# Patient Record
Sex: FEMALE | Race: BLACK OR AFRICAN AMERICAN | NOT HISPANIC OR LATINO
[De-identification: names, ages, dates, MRNs, and addresses within clinical notes are randomized per-mention and may not be internally consistent; named-entity substitution may affect disease eponyms.]

---

## 2019-10-29 PROBLEM — Z00.00 ENCOUNTER FOR PREVENTIVE HEALTH EXAMINATION: Status: ACTIVE | Noted: 2019-10-29

## 2020-01-23 ENCOUNTER — APPOINTMENT (OUTPATIENT)
Dept: PULMONOLOGY | Facility: CLINIC | Age: 85
End: 2020-01-23
Payer: MEDICARE

## 2020-01-23 VITALS
OXYGEN SATURATION: 98 % | HEART RATE: 85 BPM | RESPIRATION RATE: 16 BRPM | DIASTOLIC BLOOD PRESSURE: 70 MMHG | TEMPERATURE: 98.6 F | SYSTOLIC BLOOD PRESSURE: 120 MMHG | WEIGHT: 148 LBS | HEIGHT: 63 IN | BODY MASS INDEX: 26.22 KG/M2

## 2020-01-23 DIAGNOSIS — Z87.891 PERSONAL HISTORY OF NICOTINE DEPENDENCE: ICD-10-CM

## 2020-01-23 PROCEDURE — 99204 OFFICE O/P NEW MOD 45 MIN: CPT | Mod: 25

## 2020-01-23 PROCEDURE — 94729 DIFFUSING CAPACITY: CPT

## 2020-01-23 PROCEDURE — 94060 EVALUATION OF WHEEZING: CPT

## 2020-01-23 PROCEDURE — 94727 GAS DIL/WSHOT DETER LNG VOL: CPT

## 2020-01-24 PROBLEM — Z87.891 FORMER SMOKER: Status: ACTIVE | Noted: 2020-01-24

## 2020-01-24 RX ORDER — FLUTICASONE PROPIONATE AND SALMETEROL 50; 500 UG/1; UG/1
500-50 POWDER RESPIRATORY (INHALATION)
Refills: 0 | Status: ACTIVE | COMMUNITY

## 2020-01-24 RX ORDER — FUROSEMIDE 20 MG/1
20 TABLET ORAL
Refills: 0 | Status: ACTIVE | COMMUNITY

## 2020-01-24 RX ORDER — AMLODIPINE BESYLATE 10 MG/1
10 TABLET ORAL
Refills: 0 | Status: ACTIVE | COMMUNITY

## 2020-01-24 NOTE — REASON FOR VISIT
[Consultation] : a consultation visit [Abnormal Chest X-Ray] : abnormal Chest X-Ray [Asthma] : asthma

## 2020-01-24 NOTE — PHYSICAL EXAM
[General Appearance - In No Acute Distress] : no acute distress [Neck Cervical Mass (___cm)] : no neck mass was observed [Heart Sounds] : normal S1 and S2 [Auscultation Breath Sounds / Voice Sounds] : lungs were clear to auscultation bilaterally [Abnormal Walk] : normal gait [Nail Clubbing] : no clubbing of the fingernails [Cyanosis, Localized] : no localized cyanosis [No Focal Deficits] : no focal deficits [] : no rash [Oriented To Time, Place, And Person] : oriented to person, place, and time [Impaired Insight] : insight and judgment were intact [Erythema] : no erythema of the pharynx

## 2020-01-24 NOTE — DISCUSSION/SUMMARY
[FreeTextEntry1] : She is an 84 year old woman who was referred for pulmonary nodules noted on a routine chest x-ray. She does have a history of asthma. No history of pneumonia or tuberculosis. She stopped smoking in the 1960's. No occupational exposures. \par \par The radiographic findings are most likely a sequela of prior inflammatory disease. A CT examination of the chest will be obtained.\par \par Her asthma is adequately controlled. She can continue with Advair and albuterol as needed.\par \par I will see her again after the CT has been obtained.

## 2020-01-24 NOTE — HISTORY OF PRESENT ILLNESS
[FreeTextEntry1] : 84 year old woman who was referred for pulmonary nodules noted on a routine chest x-ray. Has a history of asthma. No history of pneumonia or tuberculosis. She stopped smoking in the 1960's. No occupational exposures. \par \par Currently on Advair and Proventil. No wheezing. PERRY noted, chronic. No history of cardiac problems. No DM. Has HTN.

## 2020-01-24 NOTE — REVIEW OF SYSTEMS
[Dyspnea] : dyspnea [Hypertension] : ~T hypertension [Fever] : no fever [Postnasal Drip] : no postnasal drip [Cough] : no cough [Sputum] : not coughing up ~M sputum [Hemoptysis] : no hemoptysis [Wheezing] : no wheezing [PND] : no PND [Reflux] : no reflux [Nasal Discharge] : no nasal discharge [Arthralgias] : no arthralgias [Headache] : no headache [Raynaud] : no Raynaud's phenomenon was observed [Depression] : no depression [Thyroid Problem] : no thyroid problem [Diabetes] : no diabetes mellitus [DVT] : no DVT [Difficulty Maintaining Sleep] : no difficulty maintaining sleep [Difficulty Initiating Sleep] : no difficulty falling asleep

## 2020-02-20 ENCOUNTER — APPOINTMENT (OUTPATIENT)
Dept: PULMONOLOGY | Facility: CLINIC | Age: 85
End: 2020-02-20
Payer: MEDICARE

## 2020-02-20 VITALS
SYSTOLIC BLOOD PRESSURE: 122 MMHG | BODY MASS INDEX: 26.22 KG/M2 | RESPIRATION RATE: 17 BRPM | DIASTOLIC BLOOD PRESSURE: 71 MMHG | TEMPERATURE: 97.3 F | HEART RATE: 83 BPM | WEIGHT: 148 LBS | OXYGEN SATURATION: 98 %

## 2020-02-20 PROCEDURE — 99214 OFFICE O/P EST MOD 30 MIN: CPT

## 2020-02-20 NOTE — REVIEW OF SYSTEMS
[Fever] : no fever [Postnasal Drip] : no postnasal drip [Sputum] : not coughing up ~M sputum [Cough] : no cough [Chest Tightness] : no chest tightness [Dyspnea] : dyspnea [Hemoptysis] : no hemoptysis [Wheezing] : no wheezing [Hypertension] : ~T hypertension [Chest Discomfort] : no chest discomfort [PND] : no PND [Nasal Discharge] : no nasal discharge [Arthralgias] : no arthralgias [Reflux] : no reflux [Headache] : no headache [Raynaud] : no Raynaud's phenomenon was observed [Depression] : no depression [Diabetes] : no diabetes mellitus [DVT] : no DVT [Thyroid Problem] : no thyroid problem [Difficulty Initiating Sleep] : no difficulty falling asleep [Difficulty Maintaining Sleep] : no difficulty maintaining sleep

## 2020-02-20 NOTE — PHYSICAL EXAM
[General Appearance - In No Acute Distress] : no acute distress [Neck Cervical Mass (___cm)] : no neck mass was observed [Erythema] : no erythema of the pharynx [Heart Sounds] : normal S1 and S2 [Auscultation Breath Sounds / Voice Sounds] : lungs were clear to auscultation bilaterally [Abnormal Walk] : normal gait [Nail Clubbing] : no clubbing of the fingernails [Cyanosis, Localized] : no localized cyanosis [] : no rash [No Focal Deficits] : no focal deficits [Oriented To Time, Place, And Person] : oriented to person, place, and time [Impaired Insight] : insight and judgment were intact

## 2020-02-20 NOTE — HISTORY OF PRESENT ILLNESS
[FreeTextEntry1] : 84 year old woman who was referred for pulmonary nodules noted on a routine chest x-ray. Has a history of asthma. No history of pneumonia or tuberculosis. She stopped smoking in the 1960's. No occupational exposures. \par \par She was placed on Symbicort recently. She is feeling much better with it. Denies any cough or chest congestion. No constitutional complaints.\par \par

## 2020-02-20 NOTE — PROCEDURE
[FreeTextEntry1] : PFT 1/23/20: Minimal obstruction noted. There was some mild air trapping. Diffusion capacity was normal. No significant change after inhalation of bronchodilator.\par \par CXR 8/21/19 (done at [facility]): A 6 mm nodule was noted in the lower left lung zone. Also a nodule was present in the right midlung zone. No pleural effusions. Adenopathy was suspected. \par \par CT Chest 1/29/20: Multiple ground glass infiltrates in both lungs. Most pronounced in the right middle lobe. Ground glass also noted in the left upper lobe. Mild lymphadenopathy. Nodule in the right lobe of the thyroid also noted.

## 2020-02-20 NOTE — DISCUSSION/SUMMARY
[FreeTextEntry1] : She is an 84 year old woman who was referred for pulmonary nodules noted on a routine chest x-ray. She gave a history of asthma. No history of pneumonia or tuberculosis. She stopped smoking in the 1960.  No occupational exposures. \par \par Chest CT, as noted above, showed multiple ground glass opacities. Followup study to be obtained in several months. She is going to [place] and she will see me when she comes back.\par \par Advised to discuss the thyroid findings with her primary care physician. A sonogram of the thyroid may be indicated.

## 2020-06-18 ENCOUNTER — APPOINTMENT (OUTPATIENT)
Dept: PULMONOLOGY | Facility: CLINIC | Age: 85
End: 2020-06-18

## 2023-10-02 ENCOUNTER — APPOINTMENT (OUTPATIENT)
Dept: PULMONOLOGY | Facility: CLINIC | Age: 88
End: 2023-10-02
Payer: MEDICARE

## 2023-10-02 VITALS
TEMPERATURE: 96 F | SYSTOLIC BLOOD PRESSURE: 142 MMHG | OXYGEN SATURATION: 97 % | HEART RATE: 79 BPM | WEIGHT: 136 LBS | BODY MASS INDEX: 24.09 KG/M2 | DIASTOLIC BLOOD PRESSURE: 72 MMHG

## 2023-10-02 DIAGNOSIS — R05.8 OTHER SPECIFIED COUGH: ICD-10-CM

## 2023-10-02 DIAGNOSIS — R07.89 OTHER CHEST PAIN: ICD-10-CM

## 2023-10-02 PROCEDURE — 99214 OFFICE O/P EST MOD 30 MIN: CPT

## 2024-01-31 ENCOUNTER — APPOINTMENT (OUTPATIENT)
Dept: PULMONOLOGY | Facility: CLINIC | Age: 89
End: 2024-01-31
Payer: MEDICARE

## 2024-01-31 VITALS
DIASTOLIC BLOOD PRESSURE: 70 MMHG | BODY MASS INDEX: 23.74 KG/M2 | HEART RATE: 77 BPM | TEMPERATURE: 96 F | SYSTOLIC BLOOD PRESSURE: 120 MMHG | OXYGEN SATURATION: 99 % | WEIGHT: 134 LBS

## 2024-01-31 DIAGNOSIS — R91.8 OTHER NONSPECIFIC ABNORMAL FINDING OF LUNG FIELD: ICD-10-CM

## 2024-01-31 PROCEDURE — 94729 DIFFUSING CAPACITY: CPT

## 2024-01-31 PROCEDURE — 94060 EVALUATION OF WHEEZING: CPT

## 2024-01-31 PROCEDURE — 94727 GAS DIL/WSHOT DETER LNG VOL: CPT

## 2024-01-31 PROCEDURE — 99214 OFFICE O/P EST MOD 30 MIN: CPT | Mod: 25

## 2024-01-31 NOTE — REVIEW OF SYSTEMS
[Hypertension] : ~T hypertension [Fever] : no fever [Fatigue] : no fatigue [Nasal Congestion] : no nasal congestion [Postnasal Drip] : no postnasal drip [Cough] : no cough [Sputum] : not coughing up ~M sputum [Dyspnea] : no dyspnea [Chest Tightness] : no chest tightness [Wheezing] : no wheezing [Edema] : ~T edema was not present [Nasal Discharge] : no nasal discharge [Reflux] : no reflux [Arthralgias] : no arthralgias [Raynaud] : no Raynaud's phenomenon was observed [Headache] : no headache [Depression] : no depression [Diabetes] : no diabetes mellitus [Thyroid Problem] : no thyroid problem [DVT] : no DVT

## 2024-01-31 NOTE — HISTORY OF PRESENT ILLNESS
[Former] : former [Never] : never [TextBox_4] : She is an 88 year old woman who was referred for pulmonary nodules noted on a routine chest x-ray. Has a history of asthma. No history of pneumonia or tuberculosis. She stopped smoking in the 1970. No occupational exposures.   The patient came for a scheduled follow up visit today. She is back in [place]. She is feeling well. No new medical issues.   Has been using Symbicort and albuterol as needed. Uses albuterol about twice a day.  [YearQuit] : 1970 [Awakes Unrefreshed] : does not awaken unrefreshed [Difficulty Maintaining Sleep] : does not have difficulty maintaining sleep [Fatigue] : no fatigue

## 2024-01-31 NOTE — PHYSICAL EXAM
[Normal Appearance] : normal appearance [Well Groomed] : well groomed [General Appearance - In No Acute Distress] : no acute distress [Neck Cervical Mass (___cm)] : no neck mass was observed [Heart Rate And Rhythm] : heart rate and rhythm were normal [Heart Sounds] : normal S1 and S2 [Auscultation Breath Sounds / Voice Sounds] : lungs were clear to auscultation bilaterally [Abdomen Tenderness] : non-tender [Abnormal Walk] : normal gait [Nail Clubbing] : no clubbing of the fingernails [Cyanosis, Localized] : no localized cyanosis [] : no rash [No Focal Deficits] : no focal deficits [Oriented To Time, Place, And Person] : oriented to person, place, and time [Impaired Insight] : insight and judgment were intact

## 2024-01-31 NOTE — PROCEDURE
[FreeTextEntry1] : PFT 1/23/20: Minimal obstruction noted. There was some mild air trapping. Diffusion capacity was normal. No significant change after inhalation of bronchodilator.  PFT 1/31/24: Minimal obstruction. Air trapping. Diffusion capacity was normal. No significant improvement after bronchodilator.   CXR 8/21/19 (done at [facility]): A 6 mm nodule was noted in the lower left lung zone. Also, a nodule was present in the right midlung zone. No pleural effusions. Adenopathy was suspected.   CXR 9/18/23: No active lung disease.  Scoliosis present.  Calcified granuloma in the lingula and mediastinal lymph nodes.  CT Chest 1/29/20: Multiple ground glass infiltrates in both lungs. Most pronounced in the right middle lobe. Ground glass also noted in the left upper lobe. Mild lymphadenopathy. Nodule in the right lobe of the thyroid also noted.

## 2024-01-31 NOTE — DISCUSSION/SUMMARY
[FreeTextEntry1] : She is an 88 year old woman who was referred for pulmonary nodules noted on a routine chest x-ray. She gave a history of asthma. No history of pneumonia or tuberculosis. She stopped smoking in the 1970.  Impression:  Asthma -Well-controlled with Symbicort and albuterol Ground glass infiltrates noted on CT -Follow-up CT needed  Recommend:  Continue with Symbicort and albuterol Follow up Chest CT requested Follow-up in 1 month

## 2024-04-19 ENCOUNTER — APPOINTMENT (OUTPATIENT)
Dept: PULMONOLOGY | Facility: CLINIC | Age: 89
End: 2024-04-19
Payer: MEDICARE

## 2024-04-19 VITALS
OXYGEN SATURATION: 99 % | TEMPERATURE: 96 F | BODY MASS INDEX: 23.38 KG/M2 | HEART RATE: 78 BPM | DIASTOLIC BLOOD PRESSURE: 74 MMHG | WEIGHT: 132 LBS | SYSTOLIC BLOOD PRESSURE: 136 MMHG

## 2024-04-19 DIAGNOSIS — J45.20 MILD INTERMITTENT ASTHMA, UNCOMPLICATED: ICD-10-CM

## 2024-04-19 DIAGNOSIS — E04.1 NONTOXIC SINGLE THYROID NODULE: ICD-10-CM

## 2024-04-19 PROCEDURE — 99214 OFFICE O/P EST MOD 30 MIN: CPT

## 2024-04-19 RX ORDER — BUDESONIDE AND FORMOTEROL FUMARATE DIHYDRATE 160; 4.5 UG/1; UG/1
160-4.5 AEROSOL RESPIRATORY (INHALATION)
Qty: 1 | Refills: 5 | Status: ACTIVE | COMMUNITY
Start: 2024-04-19 | End: 1900-01-01

## 2024-04-19 RX ORDER — ALBUTEROL SULFATE 90 UG/1
108 (90 BASE) INHALANT RESPIRATORY (INHALATION)
Qty: 1 | Refills: 5 | Status: ACTIVE | COMMUNITY
Start: 1900-01-01 | End: 1900-01-01

## 2024-04-20 PROBLEM — J45.20 MILD INTERMITTENT ASTHMA IN ADULT WITHOUT COMPLICATION: Status: ACTIVE | Noted: 2024-01-31

## 2024-04-20 NOTE — PROCEDURE
[FreeTextEntry1] : PFT 1/23/20: Minimal obstruction noted. There was some mild air trapping. Diffusion capacity was normal. No significant change after inhalation of bronchodilator.  PFT 1/31/24: Minimal obstruction. Air trapping. Diffusion capacity was normal. No significant improvement after bronchodilator.   CXR 8/21/19 (done at [facility]): A 6 mm nodule was noted in the lower left lung zone. Also, a nodule was present in the right midlung zone. No pleural effusions. Adenopathy was suspected.   CXR 9/18/23: No active lung disease.  Scoliosis present.  Calcified granuloma in the lingula and mediastinal lymph nodes.  CT Chest 1/29/20: Multiple ground glass infiltrates in both lungs. Most pronounced in the right middle lobe. Ground glass also noted in the left upper lobe. Mild lymphadenopathy. Nodule in the right lobe of the thyroid also noted.  CT Chest 2/6/24: Ground glass infiltrates resolved. Right thyroid nodule. See report.

## 2024-04-20 NOTE — REVIEW OF SYSTEMS
[Fatigue] : no fatigue [Nasal Congestion] : no nasal congestion [Postnasal Drip] : no postnasal drip [Cough] : no cough [Sputum] : not coughing up ~M sputum [Dyspnea] : no dyspnea [Wheezing] : no wheezing [Edema] : ~T edema was not present [Nasal Discharge] : no nasal discharge [Reflux] : no reflux [Arthralgias] : no arthralgias [Raynaud] : no Raynaud's phenomenon was observed [Headache] : no headache [Depression] : no depression [Diabetes] : no diabetes mellitus [Thyroid Problem] : no thyroid problem [DVT] : no DVT

## 2024-04-20 NOTE — DISCUSSION/SUMMARY
[FreeTextEntry1] : She is an 88 year old woman who was referred for pulmonary nodules noted on a routine chest x-ray. She gave a history of asthma. No history of pneumonia or tuberculosis. She stopped smoking in the 1970.  Impression Thyroid nodule noted on CT -see report Asthma -Well-controlled with Symbicort and albuterol Ground glass infiltrates - improved on CT  Recommend U/S of thyroid requested - follow up with her PCP regarding the thyroid finding Continue with Symbicort and albuterol Follow up in 6 months - sooner if necessary

## 2024-04-25 ENCOUNTER — NON-APPOINTMENT (OUTPATIENT)
Age: 89
End: 2024-04-25

## 2024-10-17 ENCOUNTER — APPOINTMENT (OUTPATIENT)
Dept: PULMONOLOGY | Facility: CLINIC | Age: 89
End: 2024-10-17
Payer: MEDICARE

## 2024-10-17 ENCOUNTER — NON-APPOINTMENT (OUTPATIENT)
Age: 89
End: 2024-10-17

## 2024-10-17 VITALS
WEIGHT: 128 LBS | DIASTOLIC BLOOD PRESSURE: 58 MMHG | TEMPERATURE: 96.6 F | BODY MASS INDEX: 22.67 KG/M2 | OXYGEN SATURATION: 97 % | HEART RATE: 84 BPM | SYSTOLIC BLOOD PRESSURE: 128 MMHG

## 2024-10-17 DIAGNOSIS — J45.20 MILD INTERMITTENT ASTHMA, UNCOMPLICATED: ICD-10-CM

## 2024-10-17 DIAGNOSIS — R91.8 OTHER NONSPECIFIC ABNORMAL FINDING OF LUNG FIELD: ICD-10-CM

## 2024-10-17 PROCEDURE — 99214 OFFICE O/P EST MOD 30 MIN: CPT

## 2025-04-16 ENCOUNTER — APPOINTMENT (OUTPATIENT)
Dept: PULMONOLOGY | Facility: CLINIC | Age: 89
End: 2025-04-16

## 2025-06-05 ENCOUNTER — APPOINTMENT (OUTPATIENT)
Dept: PULMONOLOGY | Facility: CLINIC | Age: 89
End: 2025-06-05
Payer: MEDICARE

## 2025-06-05 ENCOUNTER — NON-APPOINTMENT (OUTPATIENT)
Age: 89
End: 2025-06-05

## 2025-06-05 VITALS
HEART RATE: 81 BPM | OXYGEN SATURATION: 91 % | WEIGHT: 118 LBS | SYSTOLIC BLOOD PRESSURE: 138 MMHG | BODY MASS INDEX: 20.9 KG/M2 | DIASTOLIC BLOOD PRESSURE: 60 MMHG | TEMPERATURE: 97.5 F

## 2025-06-05 DIAGNOSIS — J44.9 CHRONIC OBSTRUCTIVE PULMONARY DISEASE, UNSPECIFIED: ICD-10-CM

## 2025-06-05 DIAGNOSIS — R05.8 OTHER SPECIFIED COUGH: ICD-10-CM

## 2025-06-05 DIAGNOSIS — E04.1 NONTOXIC SINGLE THYROID NODULE: ICD-10-CM

## 2025-06-05 DIAGNOSIS — R91.8 OTHER NONSPECIFIC ABNORMAL FINDING OF LUNG FIELD: ICD-10-CM

## 2025-06-05 DIAGNOSIS — J45.20 MILD INTERMITTENT ASTHMA, UNCOMPLICATED: ICD-10-CM

## 2025-06-05 DIAGNOSIS — R59.0 LOCALIZED ENLARGED LYMPH NODES: ICD-10-CM

## 2025-06-05 PROCEDURE — 99214 OFFICE O/P EST MOD 30 MIN: CPT | Mod: 25

## 2025-06-05 PROCEDURE — 94729 DIFFUSING CAPACITY: CPT

## 2025-06-05 PROCEDURE — 94060 EVALUATION OF WHEEZING: CPT

## 2025-06-05 RX ORDER — BUDESONIDE, GLYCOPYRROLATE, AND FORMOTEROL FUMARATE 160; 9; 4.8 UG/1; UG/1; UG/1
160-9-4.8 AEROSOL, METERED RESPIRATORY (INHALATION)
Qty: 1 | Refills: 5 | Status: ACTIVE | COMMUNITY
Start: 2025-06-05 | End: 1900-01-01

## 2025-06-30 ENCOUNTER — NON-APPOINTMENT (OUTPATIENT)
Age: 89
End: 2025-06-30

## 2025-07-10 ENCOUNTER — APPOINTMENT (OUTPATIENT)
Dept: PULMONOLOGY | Facility: CLINIC | Age: 89
End: 2025-07-10

## 2025-09-17 ENCOUNTER — APPOINTMENT (OUTPATIENT)
Dept: PULMONOLOGY | Facility: CLINIC | Age: 89
End: 2025-09-17
Payer: MEDICARE

## 2025-09-17 VITALS
WEIGHT: 111 LBS | OXYGEN SATURATION: 95 % | SYSTOLIC BLOOD PRESSURE: 150 MMHG | TEMPERATURE: 97.6 F | HEART RATE: 86 BPM | DIASTOLIC BLOOD PRESSURE: 80 MMHG | BODY MASS INDEX: 19.66 KG/M2

## 2025-09-17 VITALS — SYSTOLIC BLOOD PRESSURE: 140 MMHG | DIASTOLIC BLOOD PRESSURE: 70 MMHG

## 2025-09-17 DIAGNOSIS — R59.0 LOCALIZED ENLARGED LYMPH NODES: ICD-10-CM

## 2025-09-17 DIAGNOSIS — R91.8 OTHER NONSPECIFIC ABNORMAL FINDING OF LUNG FIELD: ICD-10-CM

## 2025-09-17 DIAGNOSIS — J44.9 CHRONIC OBSTRUCTIVE PULMONARY DISEASE, UNSPECIFIED: ICD-10-CM

## 2025-09-17 PROCEDURE — 99214 OFFICE O/P EST MOD 30 MIN: CPT

## 2025-09-17 PROCEDURE — G2211 COMPLEX E/M VISIT ADD ON: CPT
